# Patient Record
Sex: FEMALE | Race: WHITE | NOT HISPANIC OR LATINO
[De-identification: names, ages, dates, MRNs, and addresses within clinical notes are randomized per-mention and may not be internally consistent; named-entity substitution may affect disease eponyms.]

---

## 2019-07-10 ENCOUNTER — APPOINTMENT (OUTPATIENT)
Dept: ORTHOPEDIC SURGERY | Facility: CLINIC | Age: 84
End: 2019-07-10
Payer: MEDICARE

## 2019-07-10 VITALS — BODY MASS INDEX: 23.54 KG/M2 | WEIGHT: 150 LBS | HEIGHT: 67 IN

## 2019-07-10 VITALS — DIASTOLIC BLOOD PRESSURE: 80 MMHG | SYSTOLIC BLOOD PRESSURE: 137 MMHG | HEART RATE: 73 BPM

## 2019-07-10 DIAGNOSIS — Z78.9 OTHER SPECIFIED HEALTH STATUS: ICD-10-CM

## 2019-07-10 DIAGNOSIS — Z86.39 PERSONAL HISTORY OF OTHER ENDOCRINE, NUTRITIONAL AND METABOLIC DISEASE: ICD-10-CM

## 2019-07-10 DIAGNOSIS — Z87.39 PERSONAL HISTORY OF OTHER DISEASES OF THE MUSCULOSKELETAL SYSTEM AND CONNECTIVE TISSUE: ICD-10-CM

## 2019-07-10 PROCEDURE — 99203 OFFICE O/P NEW LOW 30 MIN: CPT

## 2019-07-10 RX ORDER — LEVOTHYROXINE SODIUM 0.17 MG/1
TABLET ORAL
Refills: 0 | Status: ACTIVE | COMMUNITY

## 2019-07-10 RX ORDER — CELECOXIB 50 MG/1
CAPSULE ORAL
Refills: 0 | Status: ACTIVE | COMMUNITY

## 2019-07-10 RX ORDER — ATORVASTATIN CALCIUM 80 MG/1
TABLET, FILM COATED ORAL
Refills: 0 | Status: ACTIVE | COMMUNITY

## 2019-12-08 ENCOUNTER — APPOINTMENT (OUTPATIENT)
Dept: MRI IMAGING | Facility: CLINIC | Age: 84
End: 2019-12-08
Payer: MEDICARE

## 2019-12-08 ENCOUNTER — OUTPATIENT (OUTPATIENT)
Dept: OUTPATIENT SERVICES | Facility: HOSPITAL | Age: 84
LOS: 1 days | End: 2019-12-08
Payer: MEDICARE

## 2019-12-08 DIAGNOSIS — Z00.8 ENCOUNTER FOR OTHER GENERAL EXAMINATION: ICD-10-CM

## 2019-12-08 PROCEDURE — 72141 MRI NECK SPINE W/O DYE: CPT

## 2019-12-08 PROCEDURE — 72141 MRI NECK SPINE W/O DYE: CPT | Mod: 26

## 2020-12-08 ENCOUNTER — APPOINTMENT (OUTPATIENT)
Dept: ORTHOPEDIC SURGERY | Facility: CLINIC | Age: 85
End: 2020-12-08
Payer: MEDICARE

## 2020-12-08 VITALS — TEMPERATURE: 96.8 F

## 2020-12-08 PROCEDURE — 99214 OFFICE O/P EST MOD 30 MIN: CPT | Mod: 25

## 2020-12-08 PROCEDURE — 20526 THER INJECTION CARP TUNNEL: CPT | Mod: RT

## 2020-12-08 RX ORDER — CELECOXIB 200 MG/1
200 CAPSULE ORAL
Qty: 80 | Refills: 0 | Status: ACTIVE | COMMUNITY
Start: 2020-09-24

## 2020-12-08 RX ORDER — SOLIFENACIN SUCCINATE 5 MG/1
5 TABLET ORAL
Qty: 90 | Refills: 0 | Status: ACTIVE | COMMUNITY
Start: 2020-09-24

## 2020-12-08 RX ORDER — AMOXICILLIN 500 MG/1
500 CAPSULE ORAL
Qty: 12 | Refills: 0 | Status: ACTIVE | COMMUNITY
Start: 2020-08-06

## 2021-06-08 ENCOUNTER — APPOINTMENT (OUTPATIENT)
Dept: ORTHOPEDIC SURGERY | Facility: CLINIC | Age: 86
End: 2021-06-08
Payer: MEDICARE

## 2021-06-08 PROCEDURE — 20526 THER INJECTION CARP TUNNEL: CPT | Mod: RT

## 2021-06-08 PROCEDURE — 99214 OFFICE O/P EST MOD 30 MIN: CPT | Mod: 25

## 2021-09-10 ENCOUNTER — OUTPATIENT (OUTPATIENT)
Dept: OUTPATIENT SERVICES | Facility: HOSPITAL | Age: 86
LOS: 1 days | End: 2021-09-10
Payer: MEDICARE

## 2021-09-10 VITALS
RESPIRATION RATE: 17 BRPM | HEIGHT: 66 IN | OXYGEN SATURATION: 95 % | SYSTOLIC BLOOD PRESSURE: 113 MMHG | DIASTOLIC BLOOD PRESSURE: 76 MMHG | WEIGHT: 167.99 LBS | HEART RATE: 75 BPM | TEMPERATURE: 97 F

## 2021-09-10 DIAGNOSIS — Z90.722 ACQUIRED ABSENCE OF OVARIES, BILATERAL: Chronic | ICD-10-CM

## 2021-09-10 DIAGNOSIS — Z98.890 OTHER SPECIFIED POSTPROCEDURAL STATES: Chronic | ICD-10-CM

## 2021-09-10 DIAGNOSIS — Z86.69 PERSONAL HISTORY OF OTHER DISEASES OF THE NERVOUS SYSTEM AND SENSE ORGANS: ICD-10-CM

## 2021-09-10 DIAGNOSIS — Z01.818 ENCOUNTER FOR OTHER PREPROCEDURAL EXAMINATION: ICD-10-CM

## 2021-09-10 DIAGNOSIS — Z90.49 ACQUIRED ABSENCE OF OTHER SPECIFIED PARTS OF DIGESTIVE TRACT: Chronic | ICD-10-CM

## 2021-09-10 DIAGNOSIS — E03.9 HYPOTHYROIDISM, UNSPECIFIED: ICD-10-CM

## 2021-09-10 DIAGNOSIS — Z96.659 PRESENCE OF UNSPECIFIED ARTIFICIAL KNEE JOINT: Chronic | ICD-10-CM

## 2021-09-10 DIAGNOSIS — G56.01 CARPAL TUNNEL SYNDROME, RIGHT UPPER LIMB: ICD-10-CM

## 2021-09-10 LAB
ANION GAP SERPL CALC-SCNC: 14 MMOL/L — SIGNIFICANT CHANGE UP (ref 5–17)
BUN SERPL-MCNC: 17 MG/DL — SIGNIFICANT CHANGE UP (ref 7–23)
CALCIUM SERPL-MCNC: 9.6 MG/DL — SIGNIFICANT CHANGE UP (ref 8.4–10.5)
CHLORIDE SERPL-SCNC: 104 MMOL/L — SIGNIFICANT CHANGE UP (ref 96–108)
CO2 SERPL-SCNC: 20 MMOL/L — LOW (ref 22–31)
CREAT SERPL-MCNC: 0.66 MG/DL — SIGNIFICANT CHANGE UP (ref 0.5–1.3)
GLUCOSE SERPL-MCNC: 124 MG/DL — HIGH (ref 70–99)
HCT VFR BLD CALC: 41.5 % — SIGNIFICANT CHANGE UP (ref 34.5–45)
HGB BLD-MCNC: 13.8 G/DL — SIGNIFICANT CHANGE UP (ref 11.5–15.5)
MCHC RBC-ENTMCNC: 32.2 PG — SIGNIFICANT CHANGE UP (ref 27–34)
MCHC RBC-ENTMCNC: 33.3 GM/DL — SIGNIFICANT CHANGE UP (ref 32–36)
MCV RBC AUTO: 96.7 FL — SIGNIFICANT CHANGE UP (ref 80–100)
NRBC # BLD: 0 /100 WBCS — SIGNIFICANT CHANGE UP (ref 0–0)
PLATELET # BLD AUTO: 164 K/UL — SIGNIFICANT CHANGE UP (ref 150–400)
POTASSIUM SERPL-MCNC: 4 MMOL/L — SIGNIFICANT CHANGE UP (ref 3.5–5.3)
POTASSIUM SERPL-SCNC: 4 MMOL/L — SIGNIFICANT CHANGE UP (ref 3.5–5.3)
RBC # BLD: 4.29 M/UL — SIGNIFICANT CHANGE UP (ref 3.8–5.2)
RBC # FLD: 13.2 % — SIGNIFICANT CHANGE UP (ref 10.3–14.5)
SODIUM SERPL-SCNC: 138 MMOL/L — SIGNIFICANT CHANGE UP (ref 135–145)
WBC # BLD: 5.12 K/UL — SIGNIFICANT CHANGE UP (ref 3.8–10.5)
WBC # FLD AUTO: 5.12 K/UL — SIGNIFICANT CHANGE UP (ref 3.8–10.5)

## 2021-09-10 PROCEDURE — G0463: CPT

## 2021-09-10 PROCEDURE — 80048 BASIC METABOLIC PNL TOTAL CA: CPT

## 2021-09-10 PROCEDURE — 85027 COMPLETE CBC AUTOMATED: CPT

## 2021-09-10 RX ORDER — LIDOCAINE HCL 20 MG/ML
0.2 VIAL (ML) INJECTION ONCE
Refills: 0 | Status: DISCONTINUED | OUTPATIENT
Start: 2021-09-24 | End: 2021-10-08

## 2021-09-10 RX ORDER — SODIUM CHLORIDE 9 MG/ML
3 INJECTION INTRAMUSCULAR; INTRAVENOUS; SUBCUTANEOUS EVERY 8 HOURS
Refills: 0 | Status: DISCONTINUED | OUTPATIENT
Start: 2021-09-24 | End: 2021-10-08

## 2021-09-10 NOTE — H&P PST ADULT - ATTENDING COMMENTS
Of the patient has markedly symptomatic numbness and tingling with pain consistent with right carpal tunnel syndrome.  Patient has postponed surgery because of necessity to care for her disabled .  Most recent cortisone injection was administered in June, but the patient presents now for surgery because of the marked symptomatology.  Because of severity and interference with sleep and ADL right carpal tunnel release surgery is indicated.     The patient has scattered osteoarthritis in multiple joints in both hands. Patient is functioning although there is some pain and limited range of motion. Direct intervention is not required but this will be monitored.      Surgery for right carpal tunnel syndrome is indicated because of symptoms refractory to conservative treatment, interfering with sleep, and activities of daily living. Because of the duration and severity of carpal tunnel syndrome, ongoing symptoms can be expected postoperatively. While the patient may see improvement, there is no assurance of this. The possibility of little improvement or of no improvement exists. Even though it is low, the possibility of worsening exists as well. Risk of injury to the median nerve, CRPS, persistent paresthesias, wound related pain, weakness, and many other factors, reviewed and discussed.     A lengthy and detailed discussion has been held with the patient. The surgical plan, alternative treatments, and the associated risks, complications, limitations, and expectations have been discussed with the patient. Postoperative plan, need for exercising to regain motion and function, wound care, dressing care, activities, follow up, and possible need for hand therapy have been reviewed and discussed. In addition, the expectation of postop wound related pain, wound induration, swelling, weakness of , alteration of hand and finger use and function, and palmar and forearm tenderness were reviewed. In particular, the expectation of weakness, difficulty with daily activities, and wound induration often lasting six months have been stressed.  All questions have been answered. The patient has expressed understanding and acceptance of the above and has consented to surgery. Of the patient has markedly symptomatic numbness and tingling with pain consistent with right carpal tunnel syndrome.  Patient has postponed surgery because of necessity to care for her disabled .  Most recent cortisone injection was administered in June, but the patient presents now for surgery because of the marked symptomatology.  Because of severity and interference with sleep and ADL right carpal tunnel release surgery is indicated.     The patient has scattered osteoarthritis in multiple joints in both hands. Patient is functioning although there is some pain and considerably limited range of motion, especially of interphalangeal joints. Direct intervention is not required but this will be monitored.      Surgery for right carpal tunnel syndrome is indicated because of symptoms refractory to conservative treatment, interfering with sleep, and activities of daily living. Because of the duration and severity of carpal tunnel syndrome, ongoing symptoms can be expected postoperatively. While the patient may see improvement, there is no assurance of this. The possibility of little improvement or of no improvement exists. Even though it is low, the possibility of worsening exists as well. Risk of injury to the median nerve, CRPS, persistent paresthesias, wound related pain, weakness, and many other factors, reviewed and discussed.     A lengthy and detailed discussion has been held with the patient. The surgical plan, alternative treatments, and the associated risks, complications, limitations, and expectations have been discussed with the patient. Postoperative plan, need for exercising to regain motion and function, wound care, dressing care, activities, follow up, and possible need for hand therapy have been reviewed and discussed. In addition, the expectation of postop wound related pain, wound induration, swelling, weakness of , alteration of hand and finger use and function, and palmar and forearm tenderness were reviewed. In particular, the expectation of weakness, difficulty with daily activities, and wound induration often lasting six months have been stressed.  All questions have been answered. The patient has expressed understanding and acceptance of the above and has consented to surgery.

## 2021-09-10 NOTE — H&P PST ADULT - LIVES WITH, PROFILE
-Likely 2/2 to perineal abscess  -MRSA bacteremia, day 8 of vancomycin, plan to treat for a total of 4 weeks   -See perineal abscess     spouse

## 2021-09-10 NOTE — H&P PST ADULT - NSICDXPASTMEDICALHX_GEN_ALL_CORE_FT
PAST MEDICAL HISTORY:  H/O carpal tunnel syndrome     OA (osteoarthritis)      PAST MEDICAL HISTORY:  H/O carpal tunnel syndrome     H/O hypercholesterolemia     H/O insomnia     History of heartburn     History of macular degeneration     OA (osteoarthritis)      PAST MEDICAL HISTORY:  Former cigarette smoker 30 pk/yrs    H/O carpal tunnel syndrome     H/O hypercholesterolemia     H/O insomnia     History of heartburn     History of macular degeneration     OA (osteoarthritis)

## 2021-09-10 NOTE — H&P PST ADULT - NSANTHOSAYNRD_GEN_A_CORE
No. AARON screening performed.  STOP BANG Legend: 0-2 = LOW Risk; 3-4 = INTERMEDIATE Risk; 5-8 = HIGH Risk

## 2021-09-10 NOTE — H&P PST ADULT - HISTORY OF PRESENT ILLNESS
86 year old female with h/o osteoarthritis s/p bilateral knees arthroplasty, left carpal tunnel release, hypothyroidism, hypercholesterolemia, mild depression (caregiver for 89 year old  with dementia), occasional heartburn, macular degeneration, presents for preop testing today for scheduled right wrist carpal tunnel release on 9/24/2021 with Dr. Darby.  Patient denies any s/s of covid-19 infection and no known exposure.  Patient is scheduled for covid-19 PCR swab on 9/21/2021

## 2021-09-10 NOTE — H&P PST ADULT - TEMPERATURE IN FAHRENHEIT (DEGREES F)
Erythromycin Counseling:  I discussed with the patient the risks of erythromycin including but not limited to GI upset, allergic reaction, drug rash, diarrhea, increase in liver enzymes, and yeast infections. 97.4

## 2021-09-10 NOTE — H&P PST ADULT - URINARY CATHETER
Outpatient Physical Therapy  DAILY TREATMENT     [facility] Outpatient Physical Therapy [place]  [address], [address]  [place] [address]  Phone:  [phone]  Fax:  [phone]    Date: 07/29/2020    Patient: Eun Be  YOB: 1934  MRN: [id number]     Time Calculation    Start time: 1110  Stop time: 1140 Time Calculation (min): 30 minutes     Chief Complaint: back pain/ balance    Visit #: 6    SUBJECTIVE:  No reported back pain currently. States she feels better when she comes here. Has not used her walker.       OBJECTIVE:  Current objective measures: forward flexion bias. Slight forward flexion in resting posture. Unable to ambulate without hands for support.            Therapeutic Exercises (CPT 40876):     1. Nustep, lvl 1 x 5min, SA02 remained over 90%    2. Standing weight shifts , x3min with bar in hands for support    3. Supine march, lvl 1 x 20    4. Ambulation with walker, 2min, with cues for placement /stepping/ speed    5. Supine twist without pain, 1min    6. Supine knee to chest stretch, 30 sec x 2    7. Supine pelvic tilt, x1min      Time-based treatments/modalities:    Physical Therapy Timed Treatment Charges  Therapeutic exercise minutes (CPT 73230): 30 minutes      Pain rating (1-10) before treatment:  NA  Pain rating (1-10) after treatment:  NA    ASSESSMENT:   Response to treatment:repeat of last visit with minimal changes in load or duration. Pain controlled and no reports of increased pain during exercises. High fall risk without reported back pain so will continue to stress balance issues.       PLAN/RECOMMENDATIONS:   Plan for treatment: therapy treatment to continue next visit.  Planned interventions for next visit: continue with current treatment.        no

## 2021-09-10 NOTE — H&P PST ADULT - PROBLEM SELECTOR PLAN 1
Physical Therapy Treatment    Patient Name:  Ana Maria Daniels   MRN:  [id number]    Recommendations:     Discharge Recommendations:  home health PT, home health OT   Discharge Equipment Recommendations: none   Barriers to discharge: None    Assessment:     Ana Maria Daniels is a 67 y.o. female admitted with a medical diagnosis of Primary localized osteoarthrosis of the knee, right.  She presents with the following impairments/functional limitations:  weakness, impaired endurance, impaired self care skills, impaired functional mobilty, gait instability, impaired balance, decreased lower extremity function, decreased ROM, edema, impaired skin, pain, decreased safety awareness, orthopedic precautions ;pt with good mobility today, NO LOB or SOB noted, though pt having difficulty with knee flexion during gt. .    Rehab Prognosis: Good; patient would benefit from acute skilled PT services to address these deficits and reach maximum level of function.    Recent Surgery: Procedure(s) (LRB):  ARTHROPLASTY, KNEE, TOTAL (Right) 1 Day Post-Op    Plan:     During this hospitalization, patient to be seen BID to address the identified rehab impairments via gait training, therapeutic activities, therapeutic exercises and progress toward the following goals:    · Plan of Care Expires:  02/27/20    Subjective     Chief Complaint: pain  Patient/Family Comments/goals: pt agreeable to session,  present for training as well.  Pain/Comfort:  · Pain Rating 1: 2/10(at rest)  · Location - Side 1: Right  · Location - Orientation 1: generalized  · Location 1: knee  · Pain Addressed 1: Pre-medicate for activity, Reposition, Distraction  · Pain Rating Post-Intervention 1: 8/10(during amb when trying to bend knee)      Objective:     Communicated with nurse prior to session.  Patient found up in chair with cryotherapy upon PT entry to room.     General Precautions: Standard, fall   Orthopedic Precautions:RLE weight bearing as tolerated    Braces: N/A     Functional Mobility:  · Transfers:     · Sit to Stand:  stand by assistance with rolling walker  · Gait: amb'd ~180' and 80' with RW and SBA, WBAT on RLE  · Stairs:  Pt ascended/descended 8 stair(s) with No Assistive Device with right handrail with Stand-by Assistance and Contact Guard Assistance.       AM-PAC 6 CLICK MOBILITY  Turning over in bed (including adjusting bedclothes, sheets and blankets)?: 3  Sitting down on and standing up from a chair with arms (e.g., wheelchair, bedside commode, etc.): 3  Moving from lying on back to sitting on the side of the bed?: 3  Moving to and from a bed to a chair (including a wheelchair)?: 3  Need to walk in hospital room?: 3  Climbing 3-5 steps with a railing?: 3  Basic Mobility Total Score: 18       Therapeutic Activities and Exercises:   inst'd in LE HEP of AP's, QS, SLR's, hip abd/add, heelslides; seated hip flex, LAQ's, heelslides x 10 ea.    pt with ~70* seated knee flexion     Patient left up in chair with call button in reach, nurse notified and  present..    GOALS:   Multidisciplinary Problems     Physical Therapy Goals     Not on file          Multidisciplinary Problems (Resolved)        Problem: Physical Therapy Goal    Goal Priority Disciplines Outcome Goal Variances Interventions   Physical Therapy Goal   (Resolved)     PT, PT/OT Met     Description:  Goals to be met by: 2020     Patient will increase functional independence with mobility by performin. Supine to sit with supervision.   2. Sit to supine with supervision.   3. Sit<>stand transfer with supervision using rolling walker.   4. Gait > 150 feet with SBA using rolling walker.   5. Ascend/descend 4 stairs with B handrails with CGA                     Time Tracking:     PT Received On: 20  PT Start Time: 0955     PT Stop Time: 1033  PT Total Time (min): 38 min     Billable Minutes: Gait Training 25 and Therapeutic Exercise 13    Treatment Type: Treatment  PT/PTA:  PTA     PTA Visit Number: 1     Nini Law, PTA  01/29/2020   Scheduled for right wrist carpal tunnel release  preop instruction discussed and patient verbalized understanding  instructed on the use of chlorhexidine wash

## 2021-09-10 NOTE — H&P PST ADULT - NSICDXPASTSURGICALHX_GEN_ALL_CORE_FT
PAST SURGICAL HISTORY:  H/O bilateral oophorectomy 6/16/2005    H/O carpal tunnel repair left    H/O colonoscopy with polypectomy     History of arthroplasty of knee right knee  05/2012    History of arthroplasty of knee left knee   01/27/2020    History of cholecystectomy 03/2004

## 2021-09-20 PROBLEM — Z86.69 PERSONAL HISTORY OF OTHER DISEASES OF THE NERVOUS SYSTEM AND SENSE ORGANS: Chronic | Status: ACTIVE | Noted: 2021-09-10

## 2021-09-20 PROBLEM — M19.90 UNSPECIFIED OSTEOARTHRITIS, UNSPECIFIED SITE: Chronic | Status: ACTIVE | Noted: 2021-09-10

## 2021-09-20 PROBLEM — Z87.898 PERSONAL HISTORY OF OTHER SPECIFIED CONDITIONS: Chronic | Status: ACTIVE | Noted: 2021-09-10

## 2021-09-20 PROBLEM — Z87.891 PERSONAL HISTORY OF NICOTINE DEPENDENCE: Chronic | Status: ACTIVE | Noted: 2021-09-10

## 2021-09-20 PROBLEM — Z86.39 PERSONAL HISTORY OF OTHER ENDOCRINE, NUTRITIONAL AND METABOLIC DISEASE: Chronic | Status: ACTIVE | Noted: 2021-09-10

## 2021-09-21 ENCOUNTER — OUTPATIENT (OUTPATIENT)
Dept: OUTPATIENT SERVICES | Facility: HOSPITAL | Age: 86
LOS: 1 days | End: 2021-09-21
Payer: MEDICARE

## 2021-09-21 DIAGNOSIS — Z96.659 PRESENCE OF UNSPECIFIED ARTIFICIAL KNEE JOINT: Chronic | ICD-10-CM

## 2021-09-21 DIAGNOSIS — Z90.722 ACQUIRED ABSENCE OF OVARIES, BILATERAL: Chronic | ICD-10-CM

## 2021-09-21 DIAGNOSIS — Z98.890 OTHER SPECIFIED POSTPROCEDURAL STATES: Chronic | ICD-10-CM

## 2021-09-21 DIAGNOSIS — Z90.49 ACQUIRED ABSENCE OF OTHER SPECIFIED PARTS OF DIGESTIVE TRACT: Chronic | ICD-10-CM

## 2021-09-21 DIAGNOSIS — Z11.52 ENCOUNTER FOR SCREENING FOR COVID-19: ICD-10-CM

## 2021-09-21 LAB — SARS-COV-2 RNA SPEC QL NAA+PROBE: SIGNIFICANT CHANGE UP

## 2021-09-21 PROCEDURE — U0005: CPT

## 2021-09-21 PROCEDURE — U0003: CPT

## 2021-09-21 PROCEDURE — C9803: CPT

## 2021-09-23 ENCOUNTER — TRANSCRIPTION ENCOUNTER (OUTPATIENT)
Age: 86
End: 2021-09-23

## 2021-09-23 NOTE — ASU DISCHARGE PLAN (ADULT/PEDIATRIC) - CARE PROVIDER_API CALL
Louie Darby (MD)  Orthopaedic Surgery; Surgery of the Hand  [address], [address]  [place] [address]  Phone: [phone]  Fax: [phone]  Follow Up Time: 1 week

## 2021-09-23 NOTE — ASU DISCHARGE PLAN (ADULT/PEDIATRIC) - NURSING INSTRUCTIONS
Next dose of Tylenol will be on or after ______5:11 PM_____ ,today/tonight and every 6 hours afterwards as needed for pain management, do not take any Tylenol containing products until this time. Your first dose of Tylenol was given at ______11:11 AM_____. Do not exceed more than 4000mg of Tylenol in one 24 hour setting.

## 2021-09-24 ENCOUNTER — OUTPATIENT (OUTPATIENT)
Dept: OUTPATIENT SERVICES | Facility: HOSPITAL | Age: 86
LOS: 1 days | End: 2021-09-24
Payer: MEDICARE

## 2021-09-24 ENCOUNTER — APPOINTMENT (OUTPATIENT)
Dept: ORTHOPEDIC SURGERY | Facility: HOSPITAL | Age: 86
End: 2021-09-24

## 2021-09-24 VITALS
DIASTOLIC BLOOD PRESSURE: 67 MMHG | TEMPERATURE: 97 F | SYSTOLIC BLOOD PRESSURE: 147 MMHG | RESPIRATION RATE: 16 BRPM | OXYGEN SATURATION: 99 % | HEART RATE: 60 BPM

## 2021-09-24 VITALS
RESPIRATION RATE: 16 BRPM | HEART RATE: 61 BPM | HEIGHT: 66 IN | TEMPERATURE: 99 F | SYSTOLIC BLOOD PRESSURE: 150 MMHG | WEIGHT: 167.99 LBS | OXYGEN SATURATION: 96 % | DIASTOLIC BLOOD PRESSURE: 89 MMHG

## 2021-09-24 DIAGNOSIS — Z96.659 PRESENCE OF UNSPECIFIED ARTIFICIAL KNEE JOINT: Chronic | ICD-10-CM

## 2021-09-24 DIAGNOSIS — Z98.890 OTHER SPECIFIED POSTPROCEDURAL STATES: Chronic | ICD-10-CM

## 2021-09-24 DIAGNOSIS — Z90.49 ACQUIRED ABSENCE OF OTHER SPECIFIED PARTS OF DIGESTIVE TRACT: Chronic | ICD-10-CM

## 2021-09-24 DIAGNOSIS — Z90.722 ACQUIRED ABSENCE OF OVARIES, BILATERAL: Chronic | ICD-10-CM

## 2021-09-24 DIAGNOSIS — G56.01 CARPAL TUNNEL SYNDROME, RIGHT UPPER LIMB: ICD-10-CM

## 2021-09-24 PROCEDURE — 64721 CARPAL TUNNEL SURGERY: CPT | Mod: RT

## 2021-09-24 RX ORDER — GLUCOSAMINE HCL/CHONDROITIN SU 500-400 MG
0 CAPSULE ORAL
Qty: 0 | Refills: 0 | DISCHARGE

## 2021-09-24 RX ORDER — SOLIFENACIN SUCCINATE 10 MG/1
1 TABLET ORAL
Qty: 0 | Refills: 0 | DISCHARGE

## 2021-09-24 RX ORDER — ATORVASTATIN CALCIUM 80 MG/1
1 TABLET, FILM COATED ORAL
Qty: 0 | Refills: 0 | DISCHARGE

## 2021-09-24 RX ORDER — CELECOXIB 200 MG/1
1 CAPSULE ORAL
Qty: 0 | Refills: 0 | DISCHARGE

## 2021-09-24 RX ORDER — ZOLPIDEM TARTRATE 10 MG/1
1 TABLET ORAL
Qty: 0 | Refills: 0 | DISCHARGE

## 2021-09-24 RX ORDER — ONDANSETRON 8 MG/1
4 TABLET, FILM COATED ORAL ONCE
Refills: 0 | Status: DISCONTINUED | OUTPATIENT
Start: 2021-09-24 | End: 2021-10-08

## 2021-09-24 RX ORDER — CHLORHEXIDINE GLUCONATE 213 G/1000ML
1 SOLUTION TOPICAL ONCE
Refills: 0 | Status: COMPLETED | OUTPATIENT
Start: 2021-09-24 | End: 2021-09-24

## 2021-09-24 RX ORDER — ASPIRIN/CALCIUM CARB/MAGNESIUM 324 MG
0 TABLET ORAL
Qty: 0 | Refills: 0 | DISCHARGE

## 2021-09-24 RX ORDER — LEVOTHYROXINE SODIUM 125 MCG
1 TABLET ORAL
Qty: 0 | Refills: 0 | DISCHARGE

## 2021-09-24 RX ADMIN — CHLORHEXIDINE GLUCONATE 1 APPLICATION(S): 213 SOLUTION TOPICAL at 09:29

## 2021-09-24 NOTE — ASU PATIENT PROFILE, ADULT - NSICDXPASTMEDICALHX_GEN_ALL_CORE_FT
PAST MEDICAL HISTORY:  Former cigarette smoker 30 pk/yrs    H/O carpal tunnel syndrome     H/O hypercholesterolemia     H/O insomnia     History of heartburn     History of macular degeneration     OA (osteoarthritis)

## 2021-09-24 NOTE — PRE-ANESTHESIA EVALUATION ADULT - NSANTHOSAYNRD_GEN_A_CORE
Statement Selected No. AARON screening performed.  STOP BANG Legend: 0-2 = LOW Risk; 3-4 = INTERMEDIATE Risk; 5-8 = HIGH Risk

## 2021-09-24 NOTE — ASU PATIENT PROFILE, ADULT - IS PATIENT PREGNANT?
Infant born at 28 6/7 weeks. At risk for IVH.   12/13 and 1/10 Cranial ultrasounds normal.    Plan:  Follow clinically. Cranial ultrasound prior to discharge.    no

## 2021-10-04 ENCOUNTER — APPOINTMENT (OUTPATIENT)
Dept: ORTHOPEDIC SURGERY | Facility: CLINIC | Age: 86
End: 2021-10-04
Payer: MEDICARE

## 2021-10-04 VITALS
BODY MASS INDEX: 25.11 KG/M2 | HEART RATE: 78 BPM | SYSTOLIC BLOOD PRESSURE: 130 MMHG | DIASTOLIC BLOOD PRESSURE: 81 MMHG | HEIGHT: 67 IN | WEIGHT: 160 LBS

## 2021-10-04 DIAGNOSIS — G56.01 CARPAL TUNNEL SYNDROME, RIGHT UPPER LIMB: ICD-10-CM

## 2021-10-04 DIAGNOSIS — M19.042 PRIMARY OSTEOARTHRITIS, LEFT HAND: ICD-10-CM

## 2021-10-04 DIAGNOSIS — M19.041 PRIMARY OSTEOARTHRITIS, RIGHT HAND: ICD-10-CM

## 2021-10-04 PROCEDURE — 99024 POSTOP FOLLOW-UP VISIT: CPT

## 2022-04-24 NOTE — ASU PATIENT PROFILE, ADULT - INTERNATIONAL TRAVEL
[facility]  General Surgery Clinic Progress Note    HPI:  Mena Mcdaniel is a 46 y.o. female with history of constipation and anal fissure who presents for follow up.  Says pain is much improved with Anusol.  Continues to have soft, daily BMs.  Voices no complaints.      ROS:  Negative except above    PE:  Vitals:    04/18/22 0942   BP: 128/60   Pulse: 65       Gen: Alert and oriented x3, judgement intact, NAD  CV: RRR  Resp: CTAB; breaths non-labored and symmetrical  Abd: Soft, NT, ND, BS+  Extremities: No c/c/e;  Tortuous superficial varicose veins to bilateral LE     A/P:  46 y.o. female with history of constipation who presents for follow up  -Continue with bowel regimen  -Recommend LE compression for varicose veins  -Possible referral to vascular surgery for evaluation  -RTC 1 mo    Rajwinder Mcdermott MD  General Surgery   [phone]        4/24/2022  12:10 PM       No

## 2022-05-31 ENCOUNTER — NON-APPOINTMENT (OUTPATIENT)
Age: 87
End: 2022-05-31

## 2022-07-01 ENCOUNTER — NON-APPOINTMENT (OUTPATIENT)
Age: 87
End: 2022-07-01

## 2024-01-30 ENCOUNTER — NON-APPOINTMENT (OUTPATIENT)
Age: 89
End: 2024-01-30

## 2024-04-23 NOTE — ASU PREOP CHECKLIST - NEEDLE GAUGE
Eye drops - LEFT EYE    Prednisolone acetate (pink top) - on 5/4/2024 -- change to 3 times a day     2. Start Timolol (yellow top) today 4/23/2024 -- change to every morning   
20

## 2024-07-23 ENCOUNTER — NON-APPOINTMENT (OUTPATIENT)
Age: 89
End: 2024-07-23

## 2025-02-04 ENCOUNTER — OFFICE (OUTPATIENT)
Dept: URBAN - METROPOLITAN AREA CLINIC 27 | Facility: CLINIC | Age: 89
Setting detail: OPHTHALMOLOGY
End: 2025-02-04
Payer: MEDICARE

## 2025-02-04 ENCOUNTER — RX ONLY (RX ONLY)
Age: 89
End: 2025-02-04

## 2025-02-04 DIAGNOSIS — H35.3211: ICD-10-CM

## 2025-02-04 DIAGNOSIS — H25.13: ICD-10-CM

## 2025-02-04 DIAGNOSIS — H35.3124: ICD-10-CM

## 2025-02-04 DIAGNOSIS — H26.8: ICD-10-CM

## 2025-02-04 PROCEDURE — 92134 CPTRZ OPH DX IMG PST SGM RTA: CPT | Performed by: OPHTHALMOLOGY

## 2025-02-04 PROCEDURE — 99204 OFFICE O/P NEW MOD 45 MIN: CPT | Performed by: OPHTHALMOLOGY

## 2025-02-04 ASSESSMENT — REFRACTION_CURRENTRX
OS_OVR_VA: 20/
OD_SPHERE: +3.75
OD_CYLINDER: 0.00
OS_CYLINDER: +0.25
OD_OVR_VA: 20/
OS_SPHERE: +4.00
OD_AXIS: .00
OS_AXIS: 110
OS_VPRISM_DIRECTION: SV
OD_VPRISM_DIRECTION: SV
OS_ADD: +0.00

## 2025-02-04 ASSESSMENT — TONOMETRY
OD_IOP_MMHG: 16
OS_IOP_MMHG: 12

## 2025-02-04 ASSESSMENT — KERATOMETRY
OS_K2POWER_DIOPTERS: 45.25
OD_K2POWER_DIOPTERS: 46.25
OD_AXISANGLE_DEGREES: 90
OS_K1POWER_DIOPTERS: 44.75
OS_AXISANGLE_DEGREES: 148
OD_K1POWER_DIOPTERS: 46.25

## 2025-02-04 ASSESSMENT — REFRACTION_AUTOREFRACTION
OD_AXIS: 137
OS_AXIS: 176
OD_CYLINDER: +1.25
OS_CYLINDER: +1.50
OD_SPHERE: +2.00
OS_SPHERE: +3.75

## 2025-02-04 ASSESSMENT — VISUAL ACUITY
OS_BCVA: 20/80
OD_BCVA: 20/30

## 2025-02-04 ASSESSMENT — CONFRONTATIONAL VISUAL FIELD TEST (CVF)
OD_FINDINGS: FULL
OS_FINDINGS: FULL

## 2025-02-10 ENCOUNTER — OFFICE (OUTPATIENT)
Dept: URBAN - METROPOLITAN AREA CLINIC 27 | Facility: CLINIC | Age: 89
Setting detail: OPHTHALMOLOGY
End: 2025-02-10
Payer: MEDICARE

## 2025-02-10 DIAGNOSIS — H25.11: ICD-10-CM

## 2025-02-10 DIAGNOSIS — H26.8: ICD-10-CM

## 2025-02-10 DIAGNOSIS — H25.13: ICD-10-CM

## 2025-02-10 PROBLEM — H35.3211 AGE RELATED MACULAR DEGENERATION WET; RIGHT EYE ACTIVE NEOVASCULARIZATION: Status: ACTIVE | Noted: 2025-02-04

## 2025-02-10 PROBLEM — Z96.1 PSEUDOPHAKIA-1 DAY P/O CAT W/ IOL: Status: ACTIVE | Noted: 2025-02-10

## 2025-02-10 PROCEDURE — 92136 OPHTHALMIC BIOMETRY: CPT | Mod: TC | Performed by: OPHTHALMOLOGY

## 2025-02-10 PROCEDURE — 92136 OPHTHALMIC BIOMETRY: CPT | Mod: 26,RT | Performed by: OPHTHALMOLOGY

## 2025-02-10 PROCEDURE — 99213 OFFICE O/P EST LOW 20 MIN: CPT | Performed by: OPHTHALMOLOGY

## 2025-02-10 ASSESSMENT — VISUAL ACUITY
OS_BCVA: 20/70+1
OD_BCVA: 20/25-1

## 2025-02-10 ASSESSMENT — REFRACTION_AUTOREFRACTION
OS_AXIS: 173
OS_SPHERE: +3.75
OD_SPHERE: +2.50
OS_CYLINDER: +1.50
OD_CYLINDER: +0.50
OD_AXIS: 149

## 2025-02-10 ASSESSMENT — TONOMETRY
OD_IOP_MMHG: 15
OS_IOP_MMHG: 16

## 2025-02-10 ASSESSMENT — REFRACTION_CURRENTRX
OD_SPHERE: +3.75
OS_VPRISM_DIRECTION: SV
OD_OVR_VA: 20/
OS_OVR_VA: 20/
OD_VPRISM_DIRECTION: SV
OS_CYLINDER: +0.25
OS_ADD: +0.00
OS_AXIS: 110
OS_SPHERE: +4.00
OD_AXIS: .00
OD_CYLINDER: 0.00

## 2025-02-10 ASSESSMENT — KERATOMETRY
OS_K2POWER_DIOPTERS: 45.50
OD_AXISANGLE_DEGREES: 063
OD_K2POWER_DIOPTERS: 47.00
OS_K1POWER_DIOPTERS: 45.00
OD_K1POWER_DIOPTERS: 45.25
OS_AXISANGLE_DEGREES: 150

## 2025-03-06 ENCOUNTER — AMBULATORY SURGERY CENTER (OUTPATIENT)
Dept: URBAN - METROPOLITAN AREA SURGERY 19 | Facility: SURGERY | Age: 89
Setting detail: OPHTHALMOLOGY
End: 2025-03-06
Payer: MEDICARE

## 2025-03-06 DIAGNOSIS — H52.221: ICD-10-CM

## 2025-03-06 DIAGNOSIS — H25.11: ICD-10-CM

## 2025-03-06 DIAGNOSIS — H25.89: ICD-10-CM

## 2025-03-06 PROCEDURE — S9986 NOT MEDICALLY NECESSARY SVC: HCPCS | Mod: GX,GY | Performed by: OPHTHALMOLOGY

## 2025-03-06 PROCEDURE — 66982 XCAPSL CTRC RMVL CPLX WO ECP: CPT | Mod: RT | Performed by: OPHTHALMOLOGY

## 2025-03-06 PROCEDURE — FEMTO PRECISION LASER CATARACT SURGERY: Mod: GY,RT | Performed by: OPHTHALMOLOGY

## 2025-03-07 ENCOUNTER — OFFICE (OUTPATIENT)
Dept: URBAN - METROPOLITAN AREA CLINIC 27 | Facility: CLINIC | Age: 89
Setting detail: OPHTHALMOLOGY
End: 2025-03-07
Payer: MEDICARE

## 2025-03-07 ENCOUNTER — RX ONLY (RX ONLY)
Age: 89
End: 2025-03-07

## 2025-03-07 DIAGNOSIS — Z96.1: ICD-10-CM

## 2025-03-07 DIAGNOSIS — H16.221: ICD-10-CM

## 2025-03-07 DIAGNOSIS — H26.491: ICD-10-CM

## 2025-03-07 DIAGNOSIS — H26.8: ICD-10-CM

## 2025-03-07 PROCEDURE — 99024 POSTOP FOLLOW-UP VISIT: CPT | Performed by: OPTOMETRIST

## 2025-03-07 ASSESSMENT — REFRACTION_AUTOREFRACTION
OS_SPHERE: +3.75
OD_SPHERE: -1.50
OS_CYLINDER: +1.25
OD_AXIS: 159
OD_CYLINDER: +1.25
OS_AXIS: 169

## 2025-03-07 ASSESSMENT — KERATOMETRY
METHOD_AUTO_MANUAL: AUTO
OD_K1POWER_DIOPTERS: 45.50
OS_AXISANGLE_DEGREES: 142
OD_AXISANGLE_DEGREES: 143
OS_K2POWER_DIOPTERS: 45.25
OD_K2POWER_DIOPTERS: 46.00
OS_K1POWER_DIOPTERS: 45.00

## 2025-03-07 ASSESSMENT — REFRACTION_CURRENTRX
OS_OVR_VA: 20/
OD_VPRISM_DIRECTION: SV
OS_AXIS: 110
OS_ADD: +0.00
OS_CYLINDER: +0.25
OD_OVR_VA: 20/
OD_AXIS: .00
OS_VPRISM_DIRECTION: SV
OD_SPHERE: +3.75
OS_SPHERE: +4.00
OD_CYLINDER: 0.00

## 2025-03-07 ASSESSMENT — CORNEAL EDEMA - FOLDS/STRIAE: OD_FOLDSSTRIAE: 2+

## 2025-03-07 ASSESSMENT — VISUAL ACUITY
OS_BCVA: 20/125
OD_BCVA: 20/25

## 2025-03-07 ASSESSMENT — SUPERFICIAL PUNCTATE KERATITIS (SPK): OD_SPK: 1+

## 2025-03-07 ASSESSMENT — TONOMETRY
OS_IOP_MMHG: 16
OD_IOP_MMHG: 11

## 2025-03-11 ASSESSMENT — REFRACTION_CURRENTRX
OS_SPHERE: +4.00
OS_VPRISM_DIRECTION: SV
OS_OVR_VA: 20/
OD_OVR_VA: 20/
OS_CYLINDER: +0.25
OS_ADD: +0.00
OD_VPRISM_DIRECTION: SV
OD_AXIS: .00
OD_CYLINDER: 0.00
OD_SPHERE: +3.75
OS_AXIS: 110

## 2025-03-11 ASSESSMENT — KERATOMETRY
OD_K2POWER_DIOPTERS: 47.00
OD_K1POWER_DIOPTERS: 45.25
OS_AXISANGLE_DEGREES: 150
OS_K1POWER_DIOPTERS: 45.00
OD_AXISANGLE_DEGREES: 063
OS_K2POWER_DIOPTERS: 45.50

## 2025-03-13 ENCOUNTER — OFFICE (OUTPATIENT)
Dept: URBAN - METROPOLITAN AREA CLINIC 27 | Facility: CLINIC | Age: 89
Setting detail: OPHTHALMOLOGY
End: 2025-03-13
Payer: MEDICARE

## 2025-03-13 DIAGNOSIS — H25.12: ICD-10-CM

## 2025-03-13 PROCEDURE — 92136 OPHTHALMIC BIOMETRY: CPT | Mod: 26,LT | Performed by: OPHTHALMOLOGY

## 2025-03-13 ASSESSMENT — REFRACTION_AUTOREFRACTION
OD_AXIS: 33
OD_CYLINDER: +1.25
OS_SPHERE: +3.75
OS_AXIS: 171
OS_CYLINDER: +1.75
OD_SPHERE: -1.25

## 2025-03-13 ASSESSMENT — TONOMETRY
OS_IOP_MMHG: 15
OD_IOP_MMHG: 15
OD_IOP_MMHG: 14

## 2025-03-13 ASSESSMENT — REFRACTION_CURRENTRX
OS_CYLINDER: +0.25
OD_VPRISM_DIRECTION: SV
OS_OVR_VA: 20/
OD_OVR_VA: 20/
OS_ADD: +0.00
OS_AXIS: 110
OD_CYLINDER: 0.00
OS_VPRISM_DIRECTION: SV
OD_SPHERE: +3.75
OS_SPHERE: +4.00
OD_AXIS: .00

## 2025-03-13 ASSESSMENT — KERATOMETRY
OS_AXISANGLE_DEGREES: 146
OS_K1POWER_DIOPTERS: 44.75
OS_K2POWER_DIOPTERS: 45.25
METHOD_AUTO_MANUAL: AUTO
OD_K2POWER_DIOPTERS: 45.75
OD_AXISANGLE_DEGREES: 57
OD_K1POWER_DIOPTERS: 45.25

## 2025-03-13 ASSESSMENT — VISUAL ACUITY
OS_BCVA: 20/500
OD_BCVA: 20/50-1

## 2025-03-13 ASSESSMENT — CORNEAL EDEMA CLINICAL DESCRIPTION: OD_CORNEALEDEMA: 1+

## 2025-03-13 ASSESSMENT — CONFRONTATIONAL VISUAL FIELD TEST (CVF)
OD_FINDINGS: FULL
OS_FINDINGS: FULL

## 2025-03-20 ENCOUNTER — AMBULATORY SURGERY CENTER (OUTPATIENT)
Dept: URBAN - METROPOLITAN AREA SURGERY 19 | Facility: SURGERY | Age: 89
Setting detail: OPHTHALMOLOGY
End: 2025-03-20
Payer: MEDICARE

## 2025-03-20 DIAGNOSIS — H25.12: ICD-10-CM

## 2025-03-20 DIAGNOSIS — H25.89: ICD-10-CM

## 2025-03-20 DIAGNOSIS — H52.222: ICD-10-CM

## 2025-03-20 PROCEDURE — FEMTO PRECISION LASER CATARACT SURGERY: Mod: GY | Performed by: OPHTHALMOLOGY

## 2025-03-20 PROCEDURE — S9986 NOT MEDICALLY NECESSARY SVC: HCPCS | Mod: GX,GY | Performed by: OPHTHALMOLOGY

## 2025-03-20 PROCEDURE — 66982 XCAPSL CTRC RMVL CPLX WO ECP: CPT | Mod: 79,LT | Performed by: OPHTHALMOLOGY

## 2025-03-21 ENCOUNTER — OFFICE (OUTPATIENT)
Dept: URBAN - METROPOLITAN AREA CLINIC 27 | Facility: CLINIC | Age: 89
Setting detail: OPHTHALMOLOGY
End: 2025-03-21
Payer: MEDICARE

## 2025-03-21 ENCOUNTER — RX ONLY (RX ONLY)
Age: 89
End: 2025-03-21

## 2025-03-21 DIAGNOSIS — Z96.1: ICD-10-CM

## 2025-03-21 DIAGNOSIS — H16.221: ICD-10-CM

## 2025-03-21 DIAGNOSIS — H26.8: ICD-10-CM

## 2025-03-21 PROCEDURE — 99024 POSTOP FOLLOW-UP VISIT: CPT | Performed by: OPTOMETRIST

## 2025-03-21 ASSESSMENT — REFRACTION_CURRENTRX
OS_CYLINDER: +0.25
OD_SPHERE: +3.75
OD_VPRISM_DIRECTION: SV
OD_AXIS: .00
OS_OVR_VA: 20/
OD_CYLINDER: 0.00
OS_AXIS: 110
OD_OVR_VA: 20/
OS_SPHERE: +4.00
OS_VPRISM_DIRECTION: SV

## 2025-03-21 ASSESSMENT — KERATOMETRY
OS_K2POWER_DIOPTERS: 45.75
OS_K1POWER_DIOPTERS: 45.75
METHOD_AUTO_MANUAL: AUTO
OD_AXISANGLE_DEGREES: 060
OS_AXISANGLE_DEGREES: 090
OD_K2POWER_DIOPTERS: 45.50
OD_K1POWER_DIOPTERS: 45.00

## 2025-03-21 ASSESSMENT — REFRACTION_AUTOREFRACTION
OD_SPHERE: -1.25
OS_SPHERE: -2.25
OS_AXIS: 026
OD_CYLINDER: +0.75
OD_AXIS: 030
OS_CYLINDER: +1.25

## 2025-03-21 ASSESSMENT — CORNEAL EDEMA CLINICAL DESCRIPTION
OD_CORNEALEDEMA: 1+
OS_CORNEALEDEMA: 2+ CENTRALLY

## 2025-03-21 ASSESSMENT — VISUAL ACUITY
OS_BCVA: 20/100-1
OD_BCVA: 20/70

## 2025-03-21 ASSESSMENT — TONOMETRY
OD_IOP_MMHG: 12
OS_IOP_MMHG: 21

## 2025-03-21 ASSESSMENT — SUPERFICIAL PUNCTATE KERATITIS (SPK): OS_SPK: 1+

## 2025-03-27 ENCOUNTER — RX ONLY (RX ONLY)
Age: 89
End: 2025-03-27

## 2025-03-27 ENCOUNTER — OFFICE (OUTPATIENT)
Dept: URBAN - METROPOLITAN AREA CLINIC 27 | Facility: CLINIC | Age: 89
Setting detail: OPHTHALMOLOGY
End: 2025-03-27
Payer: MEDICARE

## 2025-03-27 DIAGNOSIS — Z96.1: ICD-10-CM

## 2025-03-27 DIAGNOSIS — H26.8: ICD-10-CM

## 2025-03-27 PROBLEM — H26.491 POSTERIOR CAPSULAR OPACIFICATION; RIGHT EYE: Status: ACTIVE | Noted: 2025-03-07

## 2025-03-27 PROCEDURE — 99024 POSTOP FOLLOW-UP VISIT: CPT | Performed by: OPHTHALMOLOGY

## 2025-03-27 ASSESSMENT — CORNEAL EDEMA CLINICAL DESCRIPTION
OS_CORNEALEDEMA: 1+ 2+
OD_CORNEALEDEMA: ABSENT

## 2025-03-27 ASSESSMENT — REFRACTION_CURRENTRX
OS_SPHERE: +4.00
OS_VPRISM_DIRECTION: SV
OD_AXIS: .00
OS_CYLINDER: +0.25
OS_AXIS: 110
OD_SPHERE: +3.75
OS_OVR_VA: 20/
OD_CYLINDER: 0.00
OD_VPRISM_DIRECTION: SV
OD_OVR_VA: 20/

## 2025-03-27 ASSESSMENT — VISUAL ACUITY
OS_BCVA: 20/80-1
OD_BCVA: 20/60

## 2025-03-27 ASSESSMENT — TONOMETRY
OD_IOP_MMHG: 13
OS_IOP_MMHG: 12
OS_IOP_MMHG: 14
OD_IOP_MMHG: 13

## 2025-03-27 ASSESSMENT — KERATOMETRY
OD_K2POWER_DIOPTERS: 45.75
METHOD_AUTO_MANUAL: AUTO
OD_AXISANGLE_DEGREES: 62
OS_K2POWER_DIOPTERS: 45.25
OS_K1POWER_DIOPTERS: 44.50
OD_K1POWER_DIOPTERS: 45.00
OS_AXISANGLE_DEGREES: 156

## 2025-03-27 ASSESSMENT — REFRACTION_AUTOREFRACTION
OD_AXIS: 25
OS_AXIS: 172
OS_SPHERE: -2.00
OD_CYLINDER: +1.25
OD_SPHERE: -1.25
OS_CYLINDER: +2.25

## 2025-04-18 ENCOUNTER — OFFICE (OUTPATIENT)
Dept: URBAN - METROPOLITAN AREA CLINIC 27 | Facility: CLINIC | Age: 89
Setting detail: OPHTHALMOLOGY
End: 2025-04-18
Payer: MEDICARE

## 2025-04-18 DIAGNOSIS — Z96.1: ICD-10-CM

## 2025-04-18 DIAGNOSIS — H52.4: ICD-10-CM

## 2025-04-18 PROCEDURE — 99024 POSTOP FOLLOW-UP VISIT: CPT | Performed by: OPTOMETRIST

## 2025-04-18 PROCEDURE — 92015 DETERMINE REFRACTIVE STATE: CPT | Performed by: OPTOMETRIST

## 2025-04-18 ASSESSMENT — KERATOMETRY
OS_AXISANGLE_DEGREES: 140
OS_K2POWER_DIOPTERS: 45.25
METHOD_AUTO_MANUAL: AUTO
OD_K2POWER_DIOPTERS: 45.50
OS_K1POWER_DIOPTERS: 44.75
OD_AXISANGLE_DEGREES: 060
OD_K1POWER_DIOPTERS: 45.25

## 2025-04-18 ASSESSMENT — REFRACTION_CURRENTRX
OD_OVR_VA: 20/
OD_AXIS: .00
OS_AXIS: 110
OD_VPRISM_DIRECTION: SV
OS_SPHERE: +4.00
OS_CYLINDER: +0.25
OD_SPHERE: -0.50
OD_AXIS: 5
OS_VPRISM_DIRECTION: SV
OS_OVR_VA: 20/
OD_CYLINDER: 0.00
OD_CYLINDER: +0.50
OD_SPHERE: +3.75
OD_ADD: +3.00

## 2025-04-18 ASSESSMENT — REFRACTION_MANIFEST
OD_CYLINDER: +0.50
OD_VA1: 20/60-
OD_ADD: +3.00
OS_AXIS: 157
OS_ADD: +3.00
OS_VA1: 20/25+
OD_SPHERE: -0.50
OD_AXIS: 5
OS_CYLINDER: +1.00
OS_SPHERE: -1.00

## 2025-04-18 ASSESSMENT — REFRACTION_AUTOREFRACTION
OS_SPHERE: -1.00
OD_CYLINDER: +0.75
OD_SPHERE: -0.75
OD_AXIS: 023
OS_AXIS: 163
OS_CYLINDER: +1.50

## 2025-04-18 ASSESSMENT — CORNEAL EDEMA CLINICAL DESCRIPTION: OD_CORNEALEDEMA: ABSENT

## 2025-04-18 ASSESSMENT — VISUAL ACUITY
OS_BCVA: 20/100+1
OD_BCVA: 20/25

## 2025-04-18 ASSESSMENT — TONOMETRY
OD_IOP_MMHG: 12
OS_IOP_MMHG: 13

## 2025-04-22 ENCOUNTER — OFFICE (OUTPATIENT)
Dept: URBAN - METROPOLITAN AREA CLINIC 27 | Facility: CLINIC | Age: 89
Setting detail: OPHTHALMOLOGY
End: 2025-04-22
Payer: MEDICARE

## 2025-04-22 DIAGNOSIS — Z96.1: ICD-10-CM

## 2025-04-22 PROBLEM — H52.7 REFRACTIVE ERROR: Status: ACTIVE | Noted: 2025-04-18

## 2025-04-22 PROCEDURE — 99024 POSTOP FOLLOW-UP VISIT: CPT | Performed by: OPHTHALMOLOGY

## 2025-04-22 ASSESSMENT — REFRACTION_AUTOREFRACTION
OD_SPHERE: -1.25
OS_CYLINDER: +1.50
OS_SPHERE: -1.00
OD_AXIS: 021
OS_AXIS: 167
OD_CYLINDER: +1.25

## 2025-04-22 ASSESSMENT — REFRACTION_CURRENTRX
OS_SPHERE: +4.00
OD_SPHERE: +3.75
OS_VPRISM_DIRECTION: SV
OD_ADD: +3.00
OD_CYLINDER: 0.00
OD_SPHERE: -0.50
OD_VPRISM_DIRECTION: SV
OD_AXIS: .00
OS_AXIS: 110
OS_CYLINDER: +0.25
OD_CYLINDER: +0.50
OD_AXIS: 5
OD_OVR_VA: 20/
OS_OVR_VA: 20/

## 2025-04-22 ASSESSMENT — KERATOMETRY
METHOD_AUTO_MANUAL: AUTO
OS_K1POWER_DIOPTERS: 45.00
OS_K2POWER_DIOPTERS: 45.25
OS_AXISANGLE_DEGREES: 143
OD_AXISANGLE_DEGREES: 059
OD_K1POWER_DIOPTERS: 45.25
OD_K2POWER_DIOPTERS: 46.00

## 2025-04-22 ASSESSMENT — REFRACTION_MANIFEST
OD_SPHERE: -0.50
OS_AXIS: 157
OS_ADD: +3.00
OS_VA1: 20/25+
OD_VA1: 20/60-
OS_CYLINDER: +1.00
OD_ADD: +3.00
OD_AXIS: 5
OD_CYLINDER: +0.50
OS_SPHERE: -1.00

## 2025-04-22 ASSESSMENT — TONOMETRY
OD_IOP_MMHG: 13
OS_IOP_MMHG: 14
OS_IOP_MMHG: 13
OD_IOP_MMHG: 13

## 2025-04-22 ASSESSMENT — VISUAL ACUITY
OD_BCVA: 20/25
OS_BCVA: 20/80

## 2025-04-22 ASSESSMENT — CORNEAL EDEMA CLINICAL DESCRIPTION: OD_CORNEALEDEMA: ABSENT

## 2025-05-20 ENCOUNTER — OFFICE (OUTPATIENT)
Dept: URBAN - METROPOLITAN AREA CLINIC 27 | Facility: CLINIC | Age: 89
Setting detail: OPHTHALMOLOGY
End: 2025-05-20
Payer: MEDICARE

## 2025-05-20 DIAGNOSIS — Z96.1: ICD-10-CM

## 2025-05-20 PROCEDURE — 99024 POSTOP FOLLOW-UP VISIT: CPT | Performed by: OPHTHALMOLOGY

## 2025-05-20 ASSESSMENT — KERATOMETRY
OD_AXISANGLE_DEGREES: 054
OS_K1POWER_DIOPTERS: 45.25
OD_K2POWER_DIOPTERS: 46.25
OD_K1POWER_DIOPTERS: 45.00
OS_AXISANGLE_DEGREES: 130
METHOD_AUTO_MANUAL: AUTO
OS_K2POWER_DIOPTERS: 45.75

## 2025-05-20 ASSESSMENT — REFRACTION_CURRENTRX
OD_AXIS: .00
OS_CYLINDER: +0.25
OS_SPHERE: -1.00
OD_VPRISM_DIRECTION: BF
OD_SPHERE: -0.50
OD_SPHERE: +3.75
OS_CYLINDER: +1.00
OS_AXIS: 110
OS_OVR_VA: 20/
OD_AXIS: 014
OS_AXIS: 162
OD_CYLINDER: +0.50
OS_VPRISM_DIRECTION: SV
OD_OVR_VA: 20/
OD_VPRISM_DIRECTION: SV
OD_CYLINDER: 0.00
OS_SPHERE: +4.00
OS_VPRISM_DIRECTION: BF
OS_ADD: +3.00
OD_ADD: +3.00

## 2025-05-20 ASSESSMENT — REFRACTION_MANIFEST
OD_SPHERE: -0.50
OS_AXIS: 157
OD_ADD: +3.00
OS_ADD: +3.00
OD_AXIS: 5
OS_CYLINDER: +1.00
OS_VA1: 20/25+
OD_CYLINDER: +0.50
OS_SPHERE: -1.00
OD_VA1: 20/60-

## 2025-05-20 ASSESSMENT — VISUAL ACUITY
OD_BCVA: 20/25+1
OS_BCVA: 20/70-1+1

## 2025-05-20 ASSESSMENT — REFRACTION_AUTOREFRACTION
OD_AXIS: 019
OD_CYLINDER: +1.00
OD_SPHERE: -1.25
OS_SPHERE: -1.50
OS_CYLINDER: +1.50
OS_AXIS: 161

## 2025-05-20 ASSESSMENT — TONOMETRY
OD_IOP_MMHG: 14
OS_IOP_MMHG: 14
OD_IOP_MMHG: 13
OS_IOP_MMHG: 13

## 2025-05-20 ASSESSMENT — CORNEAL EDEMA CLINICAL DESCRIPTION: OD_CORNEALEDEMA: ABSENT

## 2025-05-22 ENCOUNTER — NON-APPOINTMENT (OUTPATIENT)
Age: 89
End: 2025-05-22

## 2025-05-22 ENCOUNTER — EMERGENCY (EMERGENCY)
Facility: HOSPITAL | Age: 89
LOS: 1 days | End: 2025-05-22
Attending: EMERGENCY MEDICINE | Admitting: EMERGENCY MEDICINE
Payer: MEDICARE

## 2025-05-22 VITALS
HEIGHT: 66 IN | TEMPERATURE: 98 F | DIASTOLIC BLOOD PRESSURE: 99 MMHG | HEART RATE: 79 BPM | RESPIRATION RATE: 18 BRPM | WEIGHT: 154.98 LBS | OXYGEN SATURATION: 99 % | SYSTOLIC BLOOD PRESSURE: 193 MMHG

## 2025-05-22 VITALS — DIASTOLIC BLOOD PRESSURE: 90 MMHG | RESPIRATION RATE: 18 BRPM | OXYGEN SATURATION: 100 % | HEART RATE: 78 BPM

## 2025-05-22 DIAGNOSIS — Z98.890 OTHER SPECIFIED POSTPROCEDURAL STATES: Chronic | ICD-10-CM

## 2025-05-22 DIAGNOSIS — Z96.659 PRESENCE OF UNSPECIFIED ARTIFICIAL KNEE JOINT: Chronic | ICD-10-CM

## 2025-05-22 DIAGNOSIS — Z90.49 ACQUIRED ABSENCE OF OTHER SPECIFIED PARTS OF DIGESTIVE TRACT: Chronic | ICD-10-CM

## 2025-05-22 DIAGNOSIS — Z90.722 ACQUIRED ABSENCE OF OVARIES, BILATERAL: Chronic | ICD-10-CM

## 2025-05-22 LAB
ALBUMIN SERPL ELPH-MCNC: 4.1 G/DL — SIGNIFICANT CHANGE UP (ref 3.3–5)
ALP SERPL-CCNC: 96 U/L — SIGNIFICANT CHANGE UP (ref 40–120)
ALT FLD-CCNC: 28 U/L — SIGNIFICANT CHANGE UP (ref 10–45)
ANION GAP SERPL CALC-SCNC: 8 MMOL/L — SIGNIFICANT CHANGE UP (ref 5–17)
APTT BLD: 34.5 SEC — SIGNIFICANT CHANGE UP (ref 26.1–36.8)
AST SERPL-CCNC: 39 U/L — SIGNIFICANT CHANGE UP (ref 10–40)
BASOPHILS # BLD AUTO: 0.04 K/UL — SIGNIFICANT CHANGE UP (ref 0–0.2)
BASOPHILS NFR BLD AUTO: 0.6 % — SIGNIFICANT CHANGE UP (ref 0–2)
BILIRUB SERPL-MCNC: 0.7 MG/DL — SIGNIFICANT CHANGE UP (ref 0.2–1.2)
BUN SERPL-MCNC: 13 MG/DL — SIGNIFICANT CHANGE UP (ref 7–23)
CALCIUM SERPL-MCNC: 10 MG/DL — SIGNIFICANT CHANGE UP (ref 8.4–10.5)
CHLORIDE SERPL-SCNC: 103 MMOL/L — SIGNIFICANT CHANGE UP (ref 96–108)
CO2 SERPL-SCNC: 30 MMOL/L — SIGNIFICANT CHANGE UP (ref 22–31)
CREAT SERPL-MCNC: 0.92 MG/DL — SIGNIFICANT CHANGE UP (ref 0.5–1.3)
EGFR: 60 ML/MIN/1.73M2 — SIGNIFICANT CHANGE UP
EGFR: 60 ML/MIN/1.73M2 — SIGNIFICANT CHANGE UP
EOSINOPHIL # BLD AUTO: 0.07 K/UL — SIGNIFICANT CHANGE UP (ref 0–0.5)
EOSINOPHIL NFR BLD AUTO: 1.1 % — SIGNIFICANT CHANGE UP (ref 0–6)
GLUCOSE SERPL-MCNC: 89 MG/DL — SIGNIFICANT CHANGE UP (ref 70–99)
HCT VFR BLD CALC: 41.7 % — SIGNIFICANT CHANGE UP (ref 34.5–45)
HGB BLD-MCNC: 14.3 G/DL — SIGNIFICANT CHANGE UP (ref 11.5–15.5)
IMM GRANULOCYTES NFR BLD AUTO: 0.2 % — SIGNIFICANT CHANGE UP (ref 0–0.9)
INR BLD: 1.01 RATIO — SIGNIFICANT CHANGE UP (ref 0.85–1.16)
LIDOCAIN IGE QN: 72 U/L — SIGNIFICANT CHANGE UP (ref 16–77)
LYMPHOCYTES # BLD AUTO: 2.13 K/UL — SIGNIFICANT CHANGE UP (ref 1–3.3)
LYMPHOCYTES # BLD AUTO: 34.1 % — SIGNIFICANT CHANGE UP (ref 13–44)
MCHC RBC-ENTMCNC: 32.3 PG — SIGNIFICANT CHANGE UP (ref 27–34)
MCHC RBC-ENTMCNC: 34.3 G/DL — SIGNIFICANT CHANGE UP (ref 32–36)
MCV RBC AUTO: 94.1 FL — SIGNIFICANT CHANGE UP (ref 80–100)
MONOCYTES # BLD AUTO: 0.53 K/UL — SIGNIFICANT CHANGE UP (ref 0–0.9)
MONOCYTES NFR BLD AUTO: 8.5 % — SIGNIFICANT CHANGE UP (ref 2–14)
NEUTROPHILS # BLD AUTO: 3.47 K/UL — SIGNIFICANT CHANGE UP (ref 1.8–7.4)
NEUTROPHILS NFR BLD AUTO: 55.5 % — SIGNIFICANT CHANGE UP (ref 43–77)
NRBC BLD AUTO-RTO: 0 /100 WBCS — SIGNIFICANT CHANGE UP (ref 0–0)
PLATELET # BLD AUTO: 182 K/UL — SIGNIFICANT CHANGE UP (ref 150–400)
POTASSIUM SERPL-MCNC: 4.3 MMOL/L — SIGNIFICANT CHANGE UP (ref 3.5–5.3)
POTASSIUM SERPL-SCNC: 4.3 MMOL/L — SIGNIFICANT CHANGE UP (ref 3.5–5.3)
PROT SERPL-MCNC: 7.9 G/DL — SIGNIFICANT CHANGE UP (ref 6–8.3)
PROTHROM AB SERPL-ACNC: 11.9 SEC — SIGNIFICANT CHANGE UP (ref 9.9–13.4)
RBC # BLD: 4.43 M/UL — SIGNIFICANT CHANGE UP (ref 3.8–5.2)
RBC # FLD: 13.1 % — SIGNIFICANT CHANGE UP (ref 10.3–14.5)
SODIUM SERPL-SCNC: 141 MMOL/L — SIGNIFICANT CHANGE UP (ref 135–145)
TROPONIN I, HIGH SENSITIVITY RESULT: 6.2 NG/L — SIGNIFICANT CHANGE UP
WBC # BLD: 6.25 K/UL — SIGNIFICANT CHANGE UP (ref 3.8–10.5)
WBC # FLD AUTO: 6.25 K/UL — SIGNIFICANT CHANGE UP (ref 3.8–10.5)

## 2025-05-22 PROCEDURE — 99285 EMERGENCY DEPT VISIT HI MDM: CPT | Mod: 25

## 2025-05-22 PROCEDURE — 84484 ASSAY OF TROPONIN QUANT: CPT

## 2025-05-22 PROCEDURE — 93005 ELECTROCARDIOGRAM TRACING: CPT

## 2025-05-22 PROCEDURE — 83690 ASSAY OF LIPASE: CPT

## 2025-05-22 PROCEDURE — 93010 ELECTROCARDIOGRAM REPORT: CPT

## 2025-05-22 PROCEDURE — 36415 COLL VENOUS BLD VENIPUNCTURE: CPT

## 2025-05-22 PROCEDURE — 85610 PROTHROMBIN TIME: CPT

## 2025-05-22 PROCEDURE — 85730 THROMBOPLASTIN TIME PARTIAL: CPT

## 2025-05-22 PROCEDURE — 71045 X-RAY EXAM CHEST 1 VIEW: CPT

## 2025-05-22 PROCEDURE — 70450 CT HEAD/BRAIN W/O DYE: CPT

## 2025-05-22 PROCEDURE — 99285 EMERGENCY DEPT VISIT HI MDM: CPT

## 2025-05-22 PROCEDURE — 85025 COMPLETE CBC W/AUTO DIFF WBC: CPT

## 2025-05-22 PROCEDURE — 80053 COMPREHEN METABOLIC PANEL: CPT

## 2025-05-22 PROCEDURE — 71045 X-RAY EXAM CHEST 1 VIEW: CPT | Mod: 26

## 2025-05-22 PROCEDURE — 70450 CT HEAD/BRAIN W/O DYE: CPT | Mod: 26

## 2025-05-22 RX ORDER — AMLODIPINE BESYLATE 10 MG/1
1 TABLET ORAL
Qty: 7 | Refills: 0
Start: 2025-05-22 | End: 2025-05-28

## 2025-05-22 RX ORDER — AMLODIPINE BESYLATE 10 MG/1
5 TABLET ORAL ONCE
Refills: 0 | Status: COMPLETED | OUTPATIENT
Start: 2025-05-22 | End: 2025-05-22

## 2025-05-22 RX ADMIN — AMLODIPINE BESYLATE 5 MILLIGRAM(S): 10 TABLET ORAL at 20:21

## 2025-05-22 NOTE — ED ADULT NURSE NOTE - CHIEF COMPLAINT QUOTE
Patient presents to ED with complaint of high blood pressure that started this morning along with slight lightheadednes , [facility]. Alert and oriented x 4.
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
## 2025-05-22 NOTE — ED PROVIDER NOTE - CLINICAL SUMMARY MEDICAL DECISION MAKING FREE TEXT BOX
89 year old female with elevated BP with dizziness, labs reviewed- acceptable, CT showed no acute hemorrhage/pathology,  results and plan discussed with the patient and she voiced good understanding and has an appointment with her primary care for this coming Thursday.  Repeat blood pressure trending down will start amlodipine

## 2025-05-22 NOTE — ED ADULT NURSE NOTE - OBJECTIVE STATEMENT
89 yr old female to ED for complaints of hypertension. Patient BIB son from home for lightheadedness and high blood pressure . Patient c/o high blood pressure that started this morning along with slight lightheadedness , Patient states was at  Urgent Care for BP systolic 200's. Patient  A&Ox4. Patient well-appearing. Pt. denies SOB, fever/chills, abdominal pain, n/v/d, chest pain, weakness/fatigue, Dr. Cary evaluating. EKG done. Labs collected and sent to lab. Safety and comfort provided.

## 2025-05-22 NOTE — ED PROVIDER NOTE - PATIENT PORTAL LINK FT
You can access the FollowMyHealth Patient Portal offered by [facility] by registering at the following website: [email or web address]. By joining [employer]’s FollowMyHealth portal, you will also be able to view your health information using other applications (apps) compatible with our system.

## 2025-05-22 NOTE — ED ADULT NURSE NOTE - BEFAST FACE
Modify Regimen: Decrease Betamethasone Diproprionate 0.05% Cream to BIW; increase to TIW if flaring.
Detail Level: Zone
No

## 2025-05-22 NOTE — ED PROVIDER NOTE - OBJECTIVE STATEMENT
89 year old female with Generalized weakness and elevated blood pressure today.   States that she was at her balance program, felt weak, checked her blood pressure, and it was elevated. Reports mild dizziness, denies nausea vomiting diarrhea, fever, cough, shortness of breath, chest/back/neck/abdominal pain, headache, focal weakness/numbness. no prior dx of HTN.

## 2025-05-22 NOTE — ED PROVIDER NOTE - NSFOLLOWUPINSTRUCTIONS_ED_ALL_ED_FT
Please follow-up with your doctor as scheduled     return to the nearest emergency medicine department immediately if any new/worsening symptoms

## 2025-05-22 NOTE — ED ADULT TRIAGE NOTE - CHIEF COMPLAINT QUOTE
Patient presents to ED with complaint of high blood pressure that started this morning along with slight lightheadednes , [facility]. Alert and oriented x 4.

## 2025-05-27 RX ORDER — SERTRALINE 100 MG/1
1 TABLET, FILM COATED ORAL
Refills: 0 | DISCHARGE

## 2025-05-27 RX ORDER — LEVOTHYROXINE SODIUM 300 MCG
1 TABLET ORAL
Refills: 0 | DISCHARGE

## 2025-05-27 RX ORDER — ASPIRIN 325 MG
1 TABLET ORAL
Refills: 0 | DISCHARGE

## 2025-05-27 RX ORDER — OMEPRAZOLE 20 MG/1
1 CAPSULE, DELAYED RELEASE ORAL
Refills: 0 | DISCHARGE

## 2025-05-27 RX ORDER — GABAPENTIN 400 MG/1
0 CAPSULE ORAL
Refills: 0 | DISCHARGE

## 2025-05-27 RX ORDER — OMEPRAZOLE 20 MG/1
0 CAPSULE, DELAYED RELEASE ORAL
Refills: 0 | DISCHARGE

## 2025-05-27 RX ORDER — ATORVASTATIN CALCIUM 80 MG/1
1 TABLET, FILM COATED ORAL
Refills: 0 | DISCHARGE

## 2025-05-28 NOTE — CHART NOTE - NSCHARTNOTEFT_GEN_A_CORE
89 y o female presenting to the ED on 5/22 with hypertension as per chart.  SW called to assist with scheduling the recommended primary care follow up appointment.  Patient attended an appointment on 5/27 with her pcp, Dr. Silvano Swanson, and she is wearing a heart monitor until her next appointment.

## 2025-06-03 ENCOUNTER — NON-APPOINTMENT (OUTPATIENT)
Age: 89
End: 2025-06-03

## 2025-06-25 ENCOUNTER — APPOINTMENT (OUTPATIENT)
Dept: SPINE | Facility: CLINIC | Age: 89
End: 2025-06-25

## 2025-06-25 ENCOUNTER — NON-APPOINTMENT (OUTPATIENT)
Age: 89
End: 2025-06-25

## 2025-06-25 VITALS
BODY MASS INDEX: 24.64 KG/M2 | HEIGHT: 67 IN | DIASTOLIC BLOOD PRESSURE: 83 MMHG | WEIGHT: 157 LBS | SYSTOLIC BLOOD PRESSURE: 136 MMHG | HEART RATE: 82 BPM | OXYGEN SATURATION: 95 %

## 2025-06-25 PROCEDURE — 99204 OFFICE O/P NEW MOD 45 MIN: CPT

## 2025-06-25 RX ORDER — QUETIAPINE FUMARATE 400 MG/1
TABLET ORAL
Refills: 0 | Status: ACTIVE | COMMUNITY

## 2025-06-25 RX ORDER — ATORVASTATIN CALCIUM 20 MG/1
20 TABLET, FILM COATED ORAL
Refills: 0 | Status: ACTIVE | COMMUNITY

## 2025-06-25 RX ORDER — OMEPRAZOLE 40 MG/1
40 CAPSULE, DELAYED RELEASE ORAL
Refills: 0 | Status: ACTIVE | COMMUNITY

## 2025-06-25 RX ORDER — AMLODIPINE BESYLATE AND BENAZEPRIL HYDROCHLORIDE 2.5; 1 MG/1; MG/1
2.5-1 CAPSULE ORAL
Refills: 0 | Status: ACTIVE | COMMUNITY

## 2025-06-25 RX ORDER — LEVOTHYROXINE SODIUM 88 UG/1
88 CAPSULE ORAL
Refills: 0 | Status: ACTIVE | COMMUNITY

## 2025-06-25 RX ORDER — TROSPIUM CHLORIDE 60 MG/1
60 CAPSULE, EXTENDED RELEASE ORAL
Refills: 0 | Status: ACTIVE | COMMUNITY

## 2025-06-25 RX ORDER — GLUCOSAM/CHOND/GLYCOSAMINOGLYC 500-400/15
LIQUID (ML) ORAL
Refills: 0 | Status: ACTIVE | COMMUNITY

## 2025-06-25 RX ORDER — KRILL/OM-3/DHA/EPA/PHOSPHO/AST 1000-230MG
CAPSULE ORAL
Refills: 0 | Status: ACTIVE | COMMUNITY

## 2025-06-25 RX ORDER — SERTRALINE 25 MG/1
25 TABLET, FILM COATED ORAL
Refills: 0 | Status: ACTIVE | COMMUNITY

## 2025-06-25 RX ORDER — GABAPENTIN 300 MG/1
300 TABLET ORAL
Refills: 0 | Status: ACTIVE | COMMUNITY

## 2025-07-03 ENCOUNTER — APPOINTMENT (OUTPATIENT)
Dept: NEUROSURGERY | Facility: CLINIC | Age: 89
End: 2025-07-03